# Patient Record
Sex: FEMALE | Race: AMERICAN INDIAN OR ALASKA NATIVE
[De-identification: names, ages, dates, MRNs, and addresses within clinical notes are randomized per-mention and may not be internally consistent; named-entity substitution may affect disease eponyms.]

---

## 2017-04-28 NOTE — MAMMOGRAPHY REPORT
BILATERAL DIGITAL DIAGNOSTIC MAMMOGRAM with CAD: 04/28/17 09:44:00



CLINICAL: Bilateral global breast pain.



COMPARISON:None available.



FINDINGS: The breasts are abdominally fatty with scattered bilateral 

fibroglandular densities.No mass, architectural distortion or 

suspicious calcifications. Bilateral benign calcifications.  







IMPRESSION: No mammographic evidence of malignancy.No explanation for 

bilateral breast pain.



BI-RADS CATEGORY:  2 - - Benign



RECOMMENDATION: Routine mammographic screening in one year.



ACR BI-RADS MAMMOGRAPHIC CODES:

0 = Needs additional imaging evaluation; 1 = Negative; 2 = Benign; 3 = 

Probably benign; 4 = Suspicious; 5 = Malignant; 6 = Known biopsy-proven 

malignancy



COMMENT:

      1.   Dense breast tissue, i.e., adenosis, fibrocystic 

            changes, etc., may obscure an underlying neoplasm.

      2.   Approximately 10% of cancers are not detected with

            mammography.

      3.   A negative mammography report should not delay biopsy 

            if a clinically suspicious mass is present.





COMMENT:

Patient follow-up letters are generated by our [employer] application.

## 2017-11-21 ENCOUNTER — HOSPITAL ENCOUNTER (OUTPATIENT)
Dept: HOSPITAL 5 - SPVIMAG | Age: 47
Discharge: HOME | End: 2017-11-21
Payer: MEDICARE

## 2017-11-21 DIAGNOSIS — M16.0: ICD-10-CM

## 2017-11-21 DIAGNOSIS — R10.30: Primary | ICD-10-CM

## 2017-11-21 DIAGNOSIS — Z90.49: ICD-10-CM

## 2017-11-21 DIAGNOSIS — I87.8: ICD-10-CM

## 2017-11-21 PROCEDURE — 74000: CPT

## 2017-11-21 NOTE — XRAY REPORT
KUB: 11/21/17



CLINICAL: Sharp lower abdominal pain for one week.  History of fibroids.



FINDINGS: Normal bowel gas pattern with moderate stool throughout the 

colon and a large volume of stool in the rectum.  No distended small or 

large bowel.  No mass or suspicious calcifications.  Numerous 

phleboliths in the pelvis.  There may also be a small calcified left 

uterine fibroid.  Surgical clips in the right upper quadrant, bilateral 

upper quadrants and in the pelvis slightly to the right of midline.  

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



IMPRESSION: Negative abdomen.  Status post cholecystectomy as well as 

additional surgical procedures.

## 2018-06-04 ENCOUNTER — HOSPITAL ENCOUNTER (OUTPATIENT)
Dept: HOSPITAL 5 - SPVWC | Age: 48
Discharge: HOME | End: 2018-06-04
Payer: MEDICARE

## 2018-06-04 DIAGNOSIS — Z12.31: Primary | ICD-10-CM

## 2018-06-04 PROCEDURE — 77067 SCR MAMMO BI INCL CAD: CPT

## 2018-06-05 NOTE — MAMMOGRAPHY REPORT
BILATERAL DIGITAL SCREENING MAMMOGRAM with CAD: 06/04/18 12:16:00



CLINICAL: Routine screening.



COMPARISON:04/28/17



FINDINGS: The breasts are heterogeneously dense, which may obscure 

small masses. No mass, architectural distortion or suspicious 

calcifications.



IMPRESSION: No mammographic evidence of malignancy.



BI-RADS CATEGORY: 1 - - Negative



RECOMMENDATION: Routine mammographic screening in one year.





COMMENT:

Patient follow-up letters are generated by our "[employer] application.

## 2018-08-07 ENCOUNTER — HOSPITAL ENCOUNTER (OUTPATIENT)
Dept: HOSPITAL 5 - SPVIMAG | Age: 48
Discharge: HOME | End: 2018-08-07
Payer: MEDICARE

## 2018-08-07 DIAGNOSIS — M19.012: Primary | ICD-10-CM

## 2018-08-07 PROCEDURE — 72040 X-RAY EXAM NECK SPINE 2-3 VW: CPT

## 2018-08-07 NOTE — XRAY REPORT
Cervical spine 3 views:



History: Neck pain.



Findings:



Normal height of vertebral bodies and intervertebral disc appear normal 

articular surfaces. No fracture. Normal prevertebral soft tissue.



Impression:



Essentially negative cervical spine

## 2018-08-08 NOTE — XRAY REPORT
XRAY LEFT SHOULDER THREE VIEWS: 08/07/18 09:59:00





CLINICAL: Left shoulder pain.



FINDINGS: No fracture or dislocation.  Normal glenohumeral joint.  

Minimal AC joint arthritis.  The soft tissues are normal.



IMPRESSION: Minimal acromioclavicular joint arthritis and otherwise 

negative.